# Patient Record
Sex: MALE | Race: BLACK OR AFRICAN AMERICAN | URBAN - METROPOLITAN AREA
[De-identification: names, ages, dates, MRNs, and addresses within clinical notes are randomized per-mention and may not be internally consistent; named-entity substitution may affect disease eponyms.]

---

## 2017-01-18 ENCOUNTER — HISTORICAL (OUTPATIENT)
Dept: INFUSION THERAPY | Facility: HOSPITAL | Age: 72
End: 2017-01-18

## 2018-01-17 ENCOUNTER — HISTORICAL (OUTPATIENT)
Dept: LAB | Facility: HOSPITAL | Age: 73
End: 2018-01-17

## 2018-01-17 LAB
ABS NEUT (OLG): 6.7 X10(3)/MCL (ref 1.5–6.9)
ALBUMIN SERPL-MCNC: 3.9 GM/DL (ref 3.4–5)
ALBUMIN/GLOB SERPL: 0.7 RATIO
ALP SERPL-CCNC: 86 UNIT/L (ref 30–113)
ALT SERPL-CCNC: 18 UNIT/L (ref 10–45)
AST SERPL-CCNC: 20 UNIT/L (ref 15–37)
BASOPHILS # BLD AUTO: 0.1 X10(3)/MCL (ref 0–0.1)
BASOPHILS NFR BLD AUTO: 1 % (ref 0–1)
BILIRUB SERPL-MCNC: 0.5 MG/DL (ref 0.1–0.9)
BILIRUBIN DIRECT+TOT PNL SERPL-MCNC: 0.2 MG/DL (ref 0–0.3)
BILIRUBIN DIRECT+TOT PNL SERPL-MCNC: 0.3 MG/DL
BUN SERPL-MCNC: 27 MG/DL (ref 10–20)
CALCIUM SERPL-MCNC: 10 MG/DL (ref 8–10.5)
CHLORIDE SERPL-SCNC: 97 MMOL/L (ref 100–108)
CO2 SERPL-SCNC: 31 MMOL/L (ref 21–35)
CREAT SERPL-MCNC: 6.19 MG/DL (ref 0.7–1.3)
EOSINOPHIL # BLD AUTO: 0.6 X10(3)/MCL (ref 0–0.6)
EOSINOPHIL NFR BLD AUTO: 6 % (ref 0–5)
ERYTHROCYTE [DISTWIDTH] IN BLOOD BY AUTOMATED COUNT: 13.4 % (ref 11.5–17)
GLOBULIN SER-MCNC: 5.6 GM/DL
GLUCOSE SERPL-MCNC: 119 MG/DL (ref 75–116)
HCT VFR BLD AUTO: 35 % (ref 42–52)
HGB BLD-MCNC: 11.6 GM/DL (ref 14–18)
LYMPHOCYTES # BLD AUTO: 1.6 X10(3)/MCL (ref 0.5–4.1)
LYMPHOCYTES NFR BLD AUTO: 16.1 % (ref 15–40)
MCH RBC QN AUTO: 31 PG (ref 27–34)
MCHC RBC AUTO-ENTMCNC: 33 GM/DL (ref 31–36)
MCV RBC AUTO: 95 FL (ref 80–99)
MONOCYTES # BLD AUTO: 0.8 X10(3)/MCL (ref 0–1.1)
MONOCYTES NFR BLD AUTO: 8 % (ref 4–12)
NEUTROPHILS # BLD AUTO: 6.7 X10(3)/MCL (ref 1.5–6.9)
NEUTROPHILS NFR BLD AUTO: 68 % (ref 43–75)
PLATELET # BLD AUTO: 319 X10(3)/MCL (ref 140–400)
PMV BLD AUTO: 10 FL (ref 6.8–10)
POTASSIUM SERPL-SCNC: 4.5 MMOL/L (ref 3.6–5.2)
PROT SERPL-MCNC: 8.4 GM/DL
PROT SERPL-MCNC: 9.5 GM/DL (ref 6.4–8.2)
RBC # BLD AUTO: 3.69 X10(6)/MCL (ref 4.7–6.1)
SODIUM SERPL-SCNC: 138 MMOL/L (ref 135–145)
WBC # SPEC AUTO: 9.8 X10(3)/MCL (ref 4.5–11.5)

## 2018-04-10 ENCOUNTER — HISTORICAL (OUTPATIENT)
Dept: RADIOLOGY | Facility: HOSPITAL | Age: 73
End: 2018-04-10

## 2018-05-25 ENCOUNTER — HISTORICAL (OUTPATIENT)
Dept: CARDIOLOGY | Facility: HOSPITAL | Age: 73
End: 2018-05-25

## 2018-05-25 LAB
ABS NEUT (OLG): 6.32 X10(3)/MCL (ref 2.1–9.2)
BASOPHILS # BLD AUTO: 0.1 X10(3)/MCL (ref 0–0.2)
BASOPHILS NFR BLD AUTO: 1 %
BUN SERPL-MCNC: 59 MG/DL (ref 7–18)
CALCIUM SERPL-MCNC: 9 MG/DL (ref 8.5–10.1)
CHLORIDE SERPL-SCNC: 99 MMOL/L (ref 98–107)
CO2 SERPL-SCNC: 25 MMOL/L (ref 21–32)
CREAT SERPL-MCNC: 8.73 MG/DL (ref 0.7–1.3)
CREAT/UREA NIT SERPL: 6.8
EOSINOPHIL # BLD AUTO: 0.4 X10(3)/MCL (ref 0–0.9)
EOSINOPHIL NFR BLD AUTO: 4 %
ERYTHROCYTE [DISTWIDTH] IN BLOOD BY AUTOMATED COUNT: 13.7 % (ref 11.5–17)
GLUCOSE SERPL-MCNC: 115 MG/DL (ref 74–106)
HCT VFR BLD AUTO: 38 % (ref 42–52)
HGB BLD-MCNC: 12.6 GM/DL (ref 14–18)
LYMPHOCYTES # BLD AUTO: 1.8 X10(3)/MCL (ref 0.6–4.6)
LYMPHOCYTES NFR BLD AUTO: 18 %
MCH RBC QN AUTO: 32.1 PG (ref 27–31)
MCHC RBC AUTO-ENTMCNC: 33.2 GM/DL (ref 33–36)
MCV RBC AUTO: 96.7 FL (ref 80–94)
MONOCYTES # BLD AUTO: 1 X10(3)/MCL (ref 0.1–1.3)
MONOCYTES NFR BLD AUTO: 10 %
NEUTROPHILS # BLD AUTO: 6.32 X10(3)/MCL (ref 2.1–9.2)
NEUTROPHILS NFR BLD AUTO: 66 %
PLATELET # BLD AUTO: 255 X10(3)/MCL (ref 130–400)
PMV BLD AUTO: 10.8 FL (ref 9.4–12.4)
POTASSIUM SERPL-SCNC: 4.9 MMOL/L (ref 3.5–5.1)
RBC # BLD AUTO: 3.93 X10(6)/MCL (ref 4.7–6.1)
RESTICK: NORMAL
RESTICK: NORMAL
SODIUM SERPL-SCNC: 132 MMOL/L (ref 136–145)
WBC # SPEC AUTO: 9.6 X10(3)/MCL (ref 4.5–11.5)

## 2018-10-05 ENCOUNTER — HISTORICAL (OUTPATIENT)
Dept: RADIOLOGY | Facility: HOSPITAL | Age: 73
End: 2018-10-05

## 2019-03-13 ENCOUNTER — HISTORICAL (OUTPATIENT)
Dept: LAB | Facility: HOSPITAL | Age: 74
End: 2019-03-13

## 2019-03-13 LAB
ABS NEUT (OLG): 5 X10(3)/MCL (ref 1.5–6.9)
ALBUMIN SERPL-MCNC: 3.7 GM/DL (ref 3.4–5)
ALBUMIN/GLOB SERPL: 1 RATIO
ALP SERPL-CCNC: 68 UNIT/L (ref 30–113)
ALT SERPL-CCNC: 29 UNIT/L (ref 10–45)
AST SERPL-CCNC: 21 UNIT/L (ref 15–37)
BASOPHILS # BLD AUTO: 0.1 X10(3)/MCL (ref 0–0.1)
BASOPHILS NFR BLD AUTO: 1 % (ref 0–1)
BILIRUB SERPL-MCNC: 0.5 MG/DL (ref 0.1–0.9)
BILIRUBIN DIRECT+TOT PNL SERPL-MCNC: 0.2 MG/DL (ref 0–0.3)
BILIRUBIN DIRECT+TOT PNL SERPL-MCNC: 0.3 MG/DL
BUN SERPL-MCNC: 42 MG/DL (ref 10–20)
CALCIUM SERPL-MCNC: 9.3 MG/DL (ref 8–10.5)
CHLORIDE SERPL-SCNC: 99 MMOL/L (ref 100–108)
CO2 SERPL-SCNC: 31 MMOL/L (ref 21–35)
CREAT SERPL-MCNC: 5.79 MG/DL (ref 0.7–1.3)
EOSINOPHIL # BLD AUTO: 0.4 X10(3)/MCL (ref 0–0.6)
EOSINOPHIL NFR BLD AUTO: 5 % (ref 0–5)
ERYTHROCYTE [DISTWIDTH] IN BLOOD BY AUTOMATED COUNT: 14 % (ref 11.5–17)
GLOBULIN SER-MCNC: 3.8 GM/DL
GLUCOSE SERPL-MCNC: 125 MG/DL (ref 75–116)
HCT VFR BLD AUTO: 35.6 % (ref 42–52)
HGB BLD-MCNC: 11.3 GM/DL (ref 14–18)
IMM GRANULOCYTES # BLD AUTO: 0.05 10*3/UL (ref 0–0.02)
IMM GRANULOCYTES NFR BLD AUTO: 0.6 % (ref 0–0.43)
LYMPHOCYTES # BLD AUTO: 1.5 X10(3)/MCL (ref 0.5–4.1)
LYMPHOCYTES NFR BLD AUTO: 19 % (ref 15–40)
MCH RBC QN AUTO: 31 PG (ref 27–34)
MCHC RBC AUTO-ENTMCNC: 32 GM/DL (ref 31–36)
MCV RBC AUTO: 98 FL (ref 80–99)
MONOCYTES # BLD AUTO: 0.8 X10(3)/MCL (ref 0–1.1)
MONOCYTES NFR BLD AUTO: 11 % (ref 4–12)
NEUTROPHILS # BLD AUTO: 5 X10(3)/MCL (ref 1.5–6.9)
NEUTROPHILS NFR BLD AUTO: 64 % (ref 43–75)
PLATELET # BLD AUTO: 244 X10(3)/MCL (ref 140–400)
PMV BLD AUTO: 10.3 FL (ref 6.8–10)
POTASSIUM SERPL-SCNC: 4.4 MMOL/L (ref 3.6–5.2)
PROT SERPL-MCNC: 7 GM/DL
PROT SERPL-MCNC: 7.5 GM/DL (ref 6.4–8.2)
RBC # BLD AUTO: 3.65 X10(6)/MCL (ref 4.7–6.1)
SODIUM SERPL-SCNC: 139 MMOL/L (ref 135–145)
WBC # SPEC AUTO: 7.9 X10(3)/MCL (ref 4.5–11.5)

## 2019-06-13 ENCOUNTER — HISTORICAL (OUTPATIENT)
Dept: RADIOLOGY | Facility: HOSPITAL | Age: 74
End: 2019-06-13

## 2019-08-30 ENCOUNTER — HISTORICAL (OUTPATIENT)
Dept: LAB | Facility: HOSPITAL | Age: 74
End: 2019-08-30

## 2019-09-02 LAB — FINAL CULTURE: NORMAL

## 2020-03-03 ENCOUNTER — HISTORICAL (OUTPATIENT)
Dept: LAB | Facility: HOSPITAL | Age: 75
End: 2020-03-03

## 2020-03-03 LAB
ABS NEUT (OLG): 6.1 X10(3)/MCL (ref 1.5–6.9)
BASOPHILS # BLD AUTO: 0 X10(3)/MCL (ref 0–0.1)
BASOPHILS NFR BLD AUTO: 0 % (ref 0–1)
BUN SERPL-MCNC: 30 MG/DL (ref 10–20)
CALCIUM SERPL-MCNC: 9.3 MG/DL (ref 8–10.5)
CHLORIDE SERPL-SCNC: 95 MMOL/L (ref 100–108)
CO2 SERPL-SCNC: 31 MMOL/L (ref 21–35)
CREAT SERPL-MCNC: 5.68 MG/DL (ref 0.7–1.3)
CREAT/UREA NIT SERPL: 5
EOSINOPHIL # BLD AUTO: 0.2 X10(3)/MCL (ref 0–0.6)
EOSINOPHIL NFR BLD AUTO: 3 % (ref 0–5)
ERYTHROCYTE [DISTWIDTH] IN BLOOD BY AUTOMATED COUNT: 15.9 % (ref 11.5–17)
GLUCOSE SERPL-MCNC: 130 MG/DL (ref 75–116)
HCT VFR BLD AUTO: 42.8 % (ref 42–52)
HGB BLD-MCNC: 13.9 GM/DL (ref 14–18)
IMM GRANULOCYTES # BLD AUTO: 0.09 10*3/UL (ref 0–0.02)
IMM GRANULOCYTES NFR BLD AUTO: 1 % (ref 0–0.43)
LYMPHOCYTES # BLD AUTO: 1.6 X10(3)/MCL (ref 0.5–4.1)
LYMPHOCYTES NFR BLD AUTO: 18 % (ref 15–40)
MCH RBC QN AUTO: 32 PG (ref 27–34)
MCHC RBC AUTO-ENTMCNC: 32 GM/DL (ref 31–36)
MCV RBC AUTO: 99 FL (ref 80–99)
MONOCYTES # BLD AUTO: 0.8 X10(3)/MCL (ref 0–1.1)
MONOCYTES NFR BLD AUTO: 9 % (ref 4–12)
NEUTROPHILS # BLD AUTO: 6.1 X10(3)/MCL (ref 1.5–6.9)
NEUTROPHILS NFR BLD AUTO: 70 % (ref 43–75)
PLATELET # BLD AUTO: 265 X10(3)/MCL (ref 140–400)
PMV BLD AUTO: 9.3 FL (ref 6.8–10)
POTASSIUM SERPL-SCNC: 5.3 MMOL/L (ref 3.6–5.2)
RBC # BLD AUTO: 4.33 X10(6)/MCL (ref 4.7–6.1)
SODIUM SERPL-SCNC: 134 MMOL/L (ref 135–145)
WBC # SPEC AUTO: 8.8 X10(3)/MCL (ref 4.5–11.5)

## 2020-06-11 ENCOUNTER — HISTORICAL (OUTPATIENT)
Dept: RADIOLOGY | Facility: HOSPITAL | Age: 75
End: 2020-06-11

## 2020-06-22 ENCOUNTER — HISTORICAL (OUTPATIENT)
Dept: RADIOLOGY | Facility: HOSPITAL | Age: 75
End: 2020-06-22

## 2020-10-01 ENCOUNTER — HISTORICAL (OUTPATIENT)
Dept: LAB | Facility: HOSPITAL | Age: 75
End: 2020-10-01

## 2020-10-01 LAB
ABS NEUT (OLG): 6.8 X10(3)/MCL (ref 1.5–6.9)
ALBUMIN SERPL-MCNC: 3.6 GM/DL (ref 3.4–4.8)
ALBUMIN/GLOB SERPL: 0.8 RATIO (ref 1.1–2)
ALP SERPL-CCNC: 79 UNIT/L (ref 40–150)
ALT SERPL-CCNC: 7 UNIT/L (ref 0–55)
AST SERPL-CCNC: 16 UNIT/L (ref 5–34)
BILIRUB SERPL-MCNC: 0.5 MG/DL
BILIRUBIN DIRECT+TOT PNL SERPL-MCNC: 0.2 MG/DL (ref 0–0.8)
BILIRUBIN DIRECT+TOT PNL SERPL-MCNC: 0.3 MG/DL (ref 0–0.5)
BUN SERPL-MCNC: 35 MG/DL (ref 8.4–25.7)
CALCIUM SERPL-MCNC: 10 MG/DL (ref 8.8–10)
CHLORIDE SERPL-SCNC: 97 MMOL/L (ref 98–107)
CHOLEST SERPL-MCNC: 129 MG/DL
CHOLEST/HDLC SERPL: 2 {RATIO} (ref 0–5)
CO2 SERPL-SCNC: 27 MMOL/L (ref 23–31)
CREAT SERPL-MCNC: 6.26 MG/DL (ref 0.73–1.18)
DEPRECATED CALCIDIOL+CALCIFEROL SERPL-MC: 35 NG/ML (ref 6.6–49.9)
ERYTHROCYTE [DISTWIDTH] IN BLOOD BY AUTOMATED COUNT: 14.1 % (ref 11.5–17)
GLOBULIN SER-MCNC: 4.4 GM/DL (ref 2.4–3.5)
GLUCOSE SERPL-MCNC: 94 MG/DL (ref 82–115)
HCT VFR BLD AUTO: 38.2 % (ref 42–52)
HDLC SERPL-MCNC: 54 MG/DL (ref 35–60)
HGB BLD-MCNC: 12.6 GM/DL (ref 14–18)
LDLC SERPL CALC-MCNC: 58 MG/DL (ref 50–140)
MCH RBC QN AUTO: 32 PG (ref 27–34)
MCHC RBC AUTO-ENTMCNC: 33 GM/DL (ref 31–36)
MCV RBC AUTO: 97 FL (ref 80–99)
PLATELET # BLD AUTO: 367 X10(3)/MCL (ref 140–400)
PMV BLD AUTO: 9.5 FL (ref 6.8–10)
POTASSIUM SERPL-SCNC: 4.6 MMOL/L (ref 3.5–5.1)
PROT SERPL-MCNC: 8 GM/DL (ref 5.8–7.6)
RBC # BLD AUTO: 3.93 X10(6)/MCL (ref 4.7–6.1)
SODIUM SERPL-SCNC: 138 MMOL/L (ref 136–145)
TRIGL SERPL-MCNC: 87 MG/DL (ref 34–140)
TSH SERPL-ACNC: 3.79 UIU/ML (ref 0.35–4.94)
VLDLC SERPL CALC-MCNC: 17 MG/DL
WBC # SPEC AUTO: 9.6 X10(3)/MCL (ref 4.5–11.5)

## 2020-11-12 ENCOUNTER — HISTORICAL (OUTPATIENT)
Dept: RADIOLOGY | Facility: HOSPITAL | Age: 75
End: 2020-11-12

## 2020-11-17 ENCOUNTER — HISTORICAL (OUTPATIENT)
Dept: RADIOLOGY | Facility: HOSPITAL | Age: 75
End: 2020-11-17

## 2020-12-30 LAB
CROSSMATCH INTERPRETATION: NORMAL
GROUP & RH: NORMAL
PRODUCT READY: NORMAL
TRANSFUSION ORDER: NORMAL

## 2020-12-31 ENCOUNTER — HISTORICAL (OUTPATIENT)
Dept: INFUSION THERAPY | Facility: HOSPITAL | Age: 75
End: 2020-12-31

## 2022-04-10 ENCOUNTER — HISTORICAL (OUTPATIENT)
Dept: ADMINISTRATIVE | Facility: HOSPITAL | Age: 77
End: 2022-04-10

## 2022-04-25 VITALS
WEIGHT: 110 LBS | BODY MASS INDEX: 19.49 KG/M2 | SYSTOLIC BLOOD PRESSURE: 108 MMHG | DIASTOLIC BLOOD PRESSURE: 52 MMHG | HEIGHT: 63 IN

## 2022-04-30 NOTE — OP NOTE
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    PROCEDURE:  Left arm fistulogram with balloon angioplasty.    HISTORY OF PRESENT ILLNESS:  This is a 73-year-old gentleman with end stage renal disease who has a left upper arm brachiobasilic fistula.  He has had significant difficulties with cannulation and was unable to cannulate earlier this week.  He presents today for fistulogram with likely intervention.    PROCEDURE IN DETAIL:  The patient was taken to the fluoroscopy suite and placed in supine position where moderate sedation was initiated.  His left arm was prepped and draped in the usual sterile fashion.  Appropriate timeout was performed.  B-mode ultrasound was utilized to evaluate the fistula and after some time of evaluating, we chose an appropriate site for cannulation so that we could appropriately address this stenosis.  We selected a site in the mid fistula just proximal to the previously placed stents in the distal fistula.  We directed our needle in arterial direction.  1% lidocaine was utilized for local anesthesia.  A #6 sheath was placed.  We then exchanged for a #6 Welsh sheath.  Fistulogram was performed and it did demonstrate long segment stenosis through the proximal fistula initially near to and adjacent to the anastomosis, and then another long segment stenosis just beyond that.  The fistula did have a good size in its mid portion, but then had a stent through the venous limb.  The axillary vein, subclavian vein, innominate vein and superior vena cava were all widely patent.  We placed a guidewire across the arterial anastomosis.  3,000 units of heparin was administered.  We utilized 6 mm balloon throughout the stenotic areas and got modest improvement.  We then utilized an  8 mm  balloon at the site of residual stenosis and still had significant wasting that could not be resolved with inflation of the balloon to burst pressure.  We then utilized a Conquest balloon and were able to get good balloon effacement.  Subsequent angiography showed good resolution of the stenosis throughout the proximal fistula and significantly improved caliber throughout the course of the cannulation zone for the fistula.  Overall pre-treatment     stenosis was about 60% and post-treatment stenosis was 0.  4-0 Monocryl was placed about the sheath site.  The sheath was removed and that completed the procedure.        ______________________________  MD NICOLÁS Frazier/YARITZA  DD:  05/25/2018  Time:  07:43AM  DT:  05/25/2018  Time:  04:28PM  Job #:  [id number]